# Patient Record
Sex: MALE | Race: WHITE | NOT HISPANIC OR LATINO | Employment: UNEMPLOYED | URBAN - METROPOLITAN AREA
[De-identification: names, ages, dates, MRNs, and addresses within clinical notes are randomized per-mention and may not be internally consistent; named-entity substitution may affect disease eponyms.]

---

## 2020-01-30 ENCOUNTER — HOSPITAL ENCOUNTER (EMERGENCY)
Facility: HOSPITAL | Age: 40
Discharge: HOME | End: 2020-01-30
Attending: EMERGENCY MEDICINE
Payer: MEDICAID

## 2020-01-30 VITALS
TEMPERATURE: 98 F | OXYGEN SATURATION: 96 % | BODY MASS INDEX: 25.05 KG/M2 | HEIGHT: 70 IN | DIASTOLIC BLOOD PRESSURE: 73 MMHG | WEIGHT: 175 LBS | SYSTOLIC BLOOD PRESSURE: 127 MMHG | RESPIRATION RATE: 23 BRPM | HEART RATE: 66 BPM

## 2020-01-30 DIAGNOSIS — R07.89 CHEST WALL PAIN: Primary | ICD-10-CM

## 2020-01-30 DIAGNOSIS — R07.9 CHEST PAIN: ICD-10-CM

## 2020-01-30 LAB
ALBUMIN SERPL BCP-MCNC: 4.9 G/DL (ref 3.5–5.2)
ALLENS TEST: ABNORMAL
ALP SERPL-CCNC: 78 U/L (ref 55–135)
ALT SERPL W/O P-5'-P-CCNC: 19 U/L (ref 10–44)
ANION GAP SERPL CALC-SCNC: 11 MMOL/L (ref 8–16)
AST SERPL-CCNC: 23 U/L (ref 10–40)
BASOPHILS # BLD AUTO: 0.05 K/UL (ref 0–0.2)
BASOPHILS NFR BLD: 0.5 % (ref 0–1.9)
BILIRUB SERPL-MCNC: 0.6 MG/DL (ref 0.1–1)
BNP SERPL-MCNC: 53 PG/ML (ref 0–99)
BUN SERPL-MCNC: 12 MG/DL (ref 6–20)
CALCIUM SERPL-MCNC: 9.7 MG/DL (ref 8.7–10.5)
CHLORIDE SERPL-SCNC: 101 MMOL/L (ref 95–110)
CO2 SERPL-SCNC: 30 MMOL/L (ref 23–29)
CREAT SERPL-MCNC: 1.1 MG/DL (ref 0.5–1.4)
DELSYS: ABNORMAL
DIFFERENTIAL METHOD: ABNORMAL
EOSINOPHIL # BLD AUTO: 0.1 K/UL (ref 0–0.5)
EOSINOPHIL NFR BLD: 0.7 % (ref 0–8)
ERYTHROCYTE [DISTWIDTH] IN BLOOD BY AUTOMATED COUNT: 13.5 % (ref 11.5–14.5)
EST. GFR  (AFRICAN AMERICAN): >60 ML/MIN/1.73 M^2
EST. GFR  (NON AFRICAN AMERICAN): >60 ML/MIN/1.73 M^2
GLUCOSE SERPL-MCNC: 84 MG/DL (ref 70–110)
GLUCOSE SERPL-MCNC: 84 MG/DL (ref 70–110)
HCO3 UR-SCNC: 35.4 MMOL/L (ref 24–28)
HCT VFR BLD AUTO: 45.9 % (ref 40–54)
HCT VFR BLD CALC: 45 %PCV (ref 36–54)
HGB BLD-MCNC: 15.5 G/DL (ref 14–18)
IMM GRANULOCYTES # BLD AUTO: 0.03 K/UL (ref 0–0.04)
IMM GRANULOCYTES NFR BLD AUTO: 0.3 % (ref 0–0.5)
LYMPHOCYTES # BLD AUTO: 1.7 K/UL (ref 1–4.8)
LYMPHOCYTES NFR BLD: 16 % (ref 18–48)
MCH RBC QN AUTO: 31.4 PG (ref 27–31)
MCHC RBC AUTO-ENTMCNC: 33.8 G/DL (ref 32–36)
MCV RBC AUTO: 93 FL (ref 82–98)
MONOCYTES # BLD AUTO: 0.9 K/UL (ref 0.3–1)
MONOCYTES NFR BLD: 8.1 % (ref 4–15)
NEUTROPHILS # BLD AUTO: 8 K/UL (ref 1.8–7.7)
NEUTROPHILS NFR BLD: 74.4 % (ref 38–73)
NRBC BLD-RTO: 0 /100 WBC
PCO2 BLDA: 55.2 MMHG (ref 35–45)
PH SMN: 7.42 [PH] (ref 7.35–7.45)
PLATELET # BLD AUTO: 207 K/UL (ref 150–350)
PMV BLD AUTO: 9.9 FL (ref 9.2–12.9)
PO2 BLDA: 23 MMHG (ref 40–60)
POC BE: 11 MMOL/L
POC IONIZED CALCIUM: 1.21 MMOL/L (ref 1.06–1.42)
POC SATURATED O2: 39 % (ref 95–100)
POC TCO2: 37 MMOL/L (ref 24–29)
POTASSIUM BLD-SCNC: 4.1 MMOL/L (ref 3.5–5.1)
POTASSIUM SERPL-SCNC: 3.8 MMOL/L (ref 3.5–5.1)
PROT SERPL-MCNC: 7.8 G/DL (ref 6–8.4)
RBC # BLD AUTO: 4.94 M/UL (ref 4.6–6.2)
SAMPLE: ABNORMAL
SITE: ABNORMAL
SODIUM BLD-SCNC: 142 MMOL/L (ref 136–145)
SODIUM SERPL-SCNC: 142 MMOL/L (ref 136–145)
TROPONIN I SERPL DL<=0.01 NG/ML-MCNC: <0.03 NG/ML
TROPONIN I SERPL DL<=0.01 NG/ML-MCNC: <0.03 NG/ML
WBC # BLD AUTO: 10.8 K/UL (ref 3.9–12.7)

## 2020-01-30 PROCEDURE — 99900035 HC TECH TIME PER 15 MIN (STAT)

## 2020-01-30 PROCEDURE — 84132 ASSAY OF SERUM POTASSIUM: CPT

## 2020-01-30 PROCEDURE — 83605 ASSAY OF LACTIC ACID: CPT

## 2020-01-30 PROCEDURE — 99285 EMERGENCY DEPT VISIT HI MDM: CPT | Mod: 25

## 2020-01-30 PROCEDURE — 85025 COMPLETE CBC W/AUTO DIFF WBC: CPT

## 2020-01-30 PROCEDURE — 80053 COMPREHEN METABOLIC PANEL: CPT

## 2020-01-30 PROCEDURE — 84295 ASSAY OF SERUM SODIUM: CPT | Mod: 59

## 2020-01-30 PROCEDURE — 84484 ASSAY OF TROPONIN QUANT: CPT

## 2020-01-30 PROCEDURE — 82803 BLOOD GASES ANY COMBINATION: CPT

## 2020-01-30 PROCEDURE — 84484 ASSAY OF TROPONIN QUANT: CPT | Mod: 91

## 2020-01-30 PROCEDURE — 83880 ASSAY OF NATRIURETIC PEPTIDE: CPT

## 2020-01-30 PROCEDURE — 93005 ELECTROCARDIOGRAM TRACING: CPT

## 2020-01-30 PROCEDURE — 82330 ASSAY OF CALCIUM: CPT

## 2020-01-30 PROCEDURE — 25000003 PHARM REV CODE 250: Performed by: EMERGENCY MEDICINE

## 2020-01-30 PROCEDURE — 36415 COLL VENOUS BLD VENIPUNCTURE: CPT

## 2020-01-30 RX ORDER — IBUPROFEN 600 MG/1
600 TABLET ORAL EVERY 6 HOURS PRN
Qty: 20 TABLET | Refills: 0 | Status: SHIPPED | OUTPATIENT
Start: 2020-01-30

## 2020-01-30 RX ORDER — METHOCARBAMOL 500 MG/1
1000 TABLET, FILM COATED ORAL 3 TIMES DAILY
Qty: 30 TABLET | Refills: 0 | Status: SHIPPED | OUTPATIENT
Start: 2020-01-30 | End: 2020-02-04

## 2020-01-30 RX ORDER — METHOCARBAMOL 500 MG/1
1500 TABLET, FILM COATED ORAL
Status: COMPLETED | OUTPATIENT
Start: 2020-01-30 | End: 2020-01-30

## 2020-01-30 RX ORDER — FAMOTIDINE 10 MG/ML
20 INJECTION INTRAVENOUS
Status: DISCONTINUED | OUTPATIENT
Start: 2020-01-30 | End: 2020-01-30

## 2020-01-30 RX ADMIN — IBUPROFEN 600 MG: 200 TABLET, FILM COATED ORAL at 02:01

## 2020-01-30 RX ADMIN — METHOCARBAMOL 1500 MG: 500 TABLET, FILM COATED ORAL at 02:01

## 2020-01-30 NOTE — ED PROVIDER NOTES
Encounter Date: 1/30/2020       History     Chief Complaint   Patient presents with    Chest Pain     39-year-old male with no significant past medical history presents with chest pain x1 day.  Patient states he was out in the backyard cutting a tree branch when he picked up a tree and throughout developed acute onset of chest pain to the left chest wall.  Patient states the pain was intermittent and would come and go.  He was able to go to a restaurant eat and states that in different positions he can reproduce the chest pain decided to come to emergency room for further evaluation.  Patient was brought in by his girlfriend who agrees that he has complained of his chest pain significantly would like to give further assessment.  Patient denies any fevers, chills, sweats, nausea, vomiting, diarrhea, shortness of breath. He is otherwise stable and has no other complaints.        Review of patient's allergies indicates:  No Known Allergies  No past medical history on file.  No past surgical history on file.  No family history on file.  Social History     Tobacco Use    Smoking status: Not on file   Substance Use Topics    Alcohol use: Not on file    Drug use: Not on file     Review of Systems   Constitutional: Negative for fever.   HENT: Negative for sore throat.    Respiratory: Negative for shortness of breath.    Cardiovascular: Positive for chest pain.   Gastrointestinal: Negative for nausea.   Genitourinary: Negative for dysuria.   Musculoskeletal: Negative for back pain.   Skin: Negative for rash.   Neurological: Negative for weakness.   Hematological: Does not bruise/bleed easily.   All other systems reviewed and are negative.      Physical Exam     Initial Vitals [01/30/20 1340]   BP Pulse Resp Temp SpO2   (!) 144/78 80 16 98.3 °F (36.8 °C) 99 %      MAP       --         Physical Exam    Nursing note and vitals reviewed.  Constitutional: He appears well-developed and well-nourished. He is not diaphoretic. No  distress.   HENT:   Head: Normocephalic and atraumatic.   Mouth/Throat: Oropharynx is clear and moist.   Eyes: Conjunctivae and EOM are normal. Pupils are equal, round, and reactive to light.   Neck: Normal range of motion. Neck supple. No thyromegaly present. No JVD present.   Cardiovascular: Normal rate, regular rhythm and normal heart sounds. Exam reveals no gallop and no friction rub.    No murmur heard.  Left chest wall tenderness on palpation   Pulmonary/Chest: Breath sounds normal. No respiratory distress. He has no wheezes. He exhibits no tenderness.   Abdominal: Soft. Bowel sounds are normal. He exhibits no distension. There is no tenderness. There is no rebound and no guarding.   Musculoskeletal: Normal range of motion. He exhibits no edema or tenderness.   Neurological: He is alert and oriented to person, place, and time. He has normal strength. No cranial nerve deficit or sensory deficit.   Skin: Skin is warm and dry. Capillary refill takes less than 2 seconds. No rash noted. No erythema.   Psychiatric: He has a normal mood and affect.         ED Course   Procedures  Labs Reviewed   CBC W/ AUTO DIFFERENTIAL - Abnormal; Notable for the following components:       Result Value    Mean Corpuscular Hemoglobin 31.4 (*)     Gran # (ANC) 8.0 (*)     Gran% 74.4 (*)     Lymph% 16.0 (*)     All other components within normal limits   COMPREHENSIVE METABOLIC PANEL - Abnormal; Notable for the following components:    CO2 30 (*)     All other components within normal limits   ISTAT PROCEDURE - Abnormal; Notable for the following components:    POC PCO2 55.2 (*)     POC PO2 23 (*)     POC HCO3 35.4 (*)     POC SATURATED O2 39 (*)     POC TCO2 37 (*)     All other components within normal limits   TROPONIN I   B-TYPE NATRIURETIC PEPTIDE   TROPONIN I        ECG Results          EKG 12-lead (In process)  Result time 01/30/20 14:45:51    In process by Interface, Lab In [facility] (01/30/20 14:45:51)                  Narrative:    Test Reason : R07.9,    Vent. Rate : 072 BPM     Atrial Rate : 072 BPM     P-R Int : 234 ms          QRS Dur : 082 ms      QT Int : 350 ms       P-R-T Axes : 055 075 055 degrees     QTc Int : 383 ms    Sinus rhythm with 1st degree A-V block  Early repolarization  Otherwise normal ECG  No previous ECGs available    Referred By: [email or web address]   SELF           Confirmed By:                             Imaging Results          X-Ray Chest PA And Lateral (Final result)  Result time 01/30/20 14:18:49    Final result by Blas Veliz MD (01/30/20 14:18:49)                 Impression:      No evidence of active cardiopulmonary disease.      Electronically signed by: Blas Veliz MD  Date:    01/30/2020  Time:    14:18             Narrative:    EXAMINATION:  XR CHEST PA AND LATERAL    CLINICAL HISTORY:  R 0 7.9 chest pain, unspecified    FINDINGS:  PA and lateral chest radiograph at 14:07 hours with no prior studies for comparison show the trachea is midline, with the cardiomediastinal silhouette and pulmonary vascular distribution within normal limits.    The lungs are normally and symmetrically expanded, with no consolidation, pleural effusion or evidence of pulmonary edema. No confluent infiltrates or pneumothorax. There are no significant osseous abnormalities.                                 Medical Decision Making:   Initial Assessment:   39-year-old male on initial assessment in mild distress secondary to concern of chest pain and discomfort.  Patient is alert oriented x3, neurologically neurovascular intact no focal deficits.  He is nontoxic appearing at this time vitals are stable.  Differential Diagnosis:   MSK chest wall pain, GERD, mi, pericarditis, rib fracture  Independently Interpreted Test(s):   I have ordered and independently interpreted X-rays - see prior notes.  I have ordered and independently interpreted EKG Reading(s) - see prior notes  Clinical Tests:   Lab Tests: Ordered and Reviewed  The  following lab test(s) were unremarkable: CBC, CMP, Troponin and BNP  Radiological Study: Ordered and Reviewed  Medical Tests: Ordered and Reviewed  ED Management:  Patient has been reassessed noted to have no acute changes condition. Patient has very low risk factors for any cardiac disease and was given Robaxin ibuprofen for reproducible chest pain. Patient's labs are unremarkable for any acute pathology at this time and patient has remained stable while in the ED.  Patient be discharged home stable condition with follow-up as needed and will continue to take ibuprofen and Robaxin.   is aware of the plan and in agreement.                                 Clinical Impression:       ICD-10-CM ICD-9-CM   1. Chest wall pain R07.89 786.52   2. Chest pain R07.9 786.50                             Alexandr Radford MD  01/30/20 1736

## 2025-06-24 ENCOUNTER — HOSPITAL ENCOUNTER (EMERGENCY)
Facility: HOSPITAL | Age: 45
Discharge: HOME | End: 2025-06-24
Attending: EMERGENCY MEDICINE

## 2025-06-24 ENCOUNTER — TELEPHONE (OUTPATIENT)
Dept: ORTHOPEDICS | Facility: CLINIC | Age: 45
End: 2025-06-24

## 2025-06-24 VITALS
DIASTOLIC BLOOD PRESSURE: 88 MMHG | BODY MASS INDEX: 25.77 KG/M2 | RESPIRATION RATE: 18 BRPM | TEMPERATURE: 98 F | OXYGEN SATURATION: 96 % | SYSTOLIC BLOOD PRESSURE: 142 MMHG | WEIGHT: 180 LBS | HEART RATE: 57 BPM | HEIGHT: 70 IN

## 2025-06-24 DIAGNOSIS — S99.911A RIGHT ANKLE INJURY, INITIAL ENCOUNTER: ICD-10-CM

## 2025-06-24 DIAGNOSIS — S97.81XA: Primary | ICD-10-CM

## 2025-06-24 LAB
HCV AB SERPL QL IA: NORMAL
HIV 1+2 AB+HIV1 P24 AG SERPL QL IA: NORMAL
HOLD SPECIMEN: NORMAL
HOLD SPECIMEN: NORMAL

## 2025-06-24 PROCEDURE — 96375 TX/PRO/DX INJ NEW DRUG ADDON: CPT

## 2025-06-24 PROCEDURE — 96376 TX/PRO/DX INJ SAME DRUG ADON: CPT

## 2025-06-24 PROCEDURE — 63600175 PHARM REV CODE 636 W HCPCS: Mod: JZ | Performed by: EMERGENCY MEDICINE

## 2025-06-24 PROCEDURE — 25000003 PHARM REV CODE 250: Performed by: EMERGENCY MEDICINE

## 2025-06-24 PROCEDURE — 36415 COLL VENOUS BLD VENIPUNCTURE: CPT | Performed by: EMERGENCY MEDICINE

## 2025-06-24 PROCEDURE — 99284 EMERGENCY DEPT VISIT MOD MDM: CPT | Mod: 25

## 2025-06-24 PROCEDURE — 86803 HEPATITIS C AB TEST: CPT | Performed by: EMERGENCY MEDICINE

## 2025-06-24 PROCEDURE — 96374 THER/PROPH/DIAG INJ IV PUSH: CPT

## 2025-06-24 PROCEDURE — 87389 HIV-1 AG W/HIV-1&-2 AB AG IA: CPT | Performed by: EMERGENCY MEDICINE

## 2025-06-24 RX ORDER — CEPHALEXIN 500 MG/1
500 CAPSULE ORAL 4 TIMES DAILY
Qty: 20 CAPSULE | Refills: 0 | Status: SHIPPED | OUTPATIENT
Start: 2025-06-24 | End: 2025-06-29

## 2025-06-24 RX ORDER — OXYCODONE AND ACETAMINOPHEN 10; 325 MG/1; MG/1
1 TABLET ORAL
Refills: 0 | Status: COMPLETED | OUTPATIENT
Start: 2025-06-24 | End: 2025-06-24

## 2025-06-24 RX ORDER — HYDROMORPHONE HYDROCHLORIDE 1 MG/ML
1 INJECTION, SOLUTION INTRAMUSCULAR; INTRAVENOUS; SUBCUTANEOUS
Refills: 0 | Status: COMPLETED | OUTPATIENT
Start: 2025-06-24 | End: 2025-06-24

## 2025-06-24 RX ORDER — CEFAZOLIN SODIUM 1 G/3ML
1 INJECTION, POWDER, FOR SOLUTION INTRAMUSCULAR; INTRAVENOUS
Status: DISCONTINUED | OUTPATIENT
Start: 2025-06-24 | End: 2025-06-24

## 2025-06-24 RX ORDER — CEFAZOLIN SODIUM 1 G/3ML
1 INJECTION, POWDER, FOR SOLUTION INTRAMUSCULAR; INTRAVENOUS
Status: COMPLETED | OUTPATIENT
Start: 2025-06-24 | End: 2025-06-24

## 2025-06-24 RX ORDER — HYDROMORPHONE HYDROCHLORIDE 2 MG/ML
2 INJECTION, SOLUTION INTRAMUSCULAR; INTRAVENOUS; SUBCUTANEOUS
Refills: 0 | Status: COMPLETED | OUTPATIENT
Start: 2025-06-24 | End: 2025-06-24

## 2025-06-24 RX ORDER — OXYCODONE AND ACETAMINOPHEN 10; 325 MG/1; MG/1
1 TABLET ORAL EVERY 6 HOURS PRN
Qty: 12 TABLET | Refills: 0 | Status: SHIPPED | OUTPATIENT
Start: 2025-06-24

## 2025-06-24 RX ADMIN — HYDROMORPHONE HYDROCHLORIDE 1 MG: 1 INJECTION, SOLUTION INTRAMUSCULAR; INTRAVENOUS; SUBCUTANEOUS at 01:06

## 2025-06-24 RX ADMIN — CEFAZOLIN 1 G: 330 INJECTION, POWDER, FOR SOLUTION INTRAMUSCULAR; INTRAVENOUS at 01:06

## 2025-06-24 RX ADMIN — BACITRACIN ZINC, NEOMYCIN, POLYMYXIN B 1 EACH: 400; 3.5; 5 OINTMENT TOPICAL at 02:06

## 2025-06-24 RX ADMIN — OXYCODONE HYDROCHLORIDE AND ACETAMINOPHEN 1 TABLET: 10; 325 TABLET ORAL at 01:06

## 2025-06-24 RX ADMIN — HYDROMORPHONE HYDROCHLORIDE 2 MG: 2 INJECTION, SOLUTION INTRAMUSCULAR; INTRAVENOUS; SUBCUTANEOUS at 12:06

## 2025-06-24 NOTE — ED NOTES
Patient placed in room,15, bleeding controlled, obvious swelling to the right ankle with open wound, cap refill >3 sec with pedal pulses present

## 2025-06-24 NOTE — ED PROVIDER NOTES
Encounter Date: 6/24/2025       History     Chief Complaint   Patient presents with    Foot Injury     Right foot crushed by skid steer     HPI 45-year-old man who presents emergency department with the acute onset traumatic right foot pain when bucket of a skid steer bulldozer came down on his foot just prior to arrival.  Review of patient's allergies indicates:  No Known Allergies  No past medical history on file.  No past surgical history on file.  No family history on file.  Social History[1]  Review of Systems   Constitutional:  Negative for fever.   HENT:  Negative for sore throat.    Respiratory:  Negative for shortness of breath.    Cardiovascular:  Negative for chest pain.   Gastrointestinal:  Negative for nausea.   Genitourinary:  Negative for dysuria.   Musculoskeletal:  Positive for arthralgias and gait problem. Negative for back pain.   Skin:  Negative for rash.   Neurological:  Negative for weakness.   Hematological:  Does not bruise/bleed easily.       Physical Exam     Initial Vitals [06/24/25 1206]   BP Pulse Resp Temp SpO2   (!) 142/88 99 18 97.6 °F (36.4 °C) 97 %      MAP       --         Physical Exam    Nursing note and vitals reviewed.  Constitutional: He appears well-developed and well-nourished.   HENT:   Head: Normocephalic and atraumatic.   Eyes: EOM are normal. Pupils are equal, round, and reactive to light.   Neck: Neck supple.   Cardiovascular:  Normal rate and intact distal pulses.           Pulmonary/Chest: Breath sounds normal. No respiratory distress.   Musculoskeletal:         General: Tenderness and edema present. Normal range of motion.      Cervical back: Neck supple.      Comments: Small open wound that is not bleeding over the dorsum of the midfoot.  Surrounding edema and bruising.  PT and DP pulses are intact.  Patient able to wiggle toes and plantar flex and dorsiflexes ankle fully.  Has normal sensation.     Neurological: He is alert and oriented to person, place, and time.  He has normal strength. No sensory deficit. GCS score is 15. GCS eye subscore is 4. GCS verbal subscore is 5. GCS motor subscore is 6.   Skin: Skin is warm and dry.               ED Course   Procedures  Labs Reviewed   HEPATITIS C ANTIBODY - Normal       Result Value    Hep C Ab Interp Non-Reactive     HIV 1 / 2 ANTIBODY - Normal    HIV 1/2 Ag/Ab Non-Reactive     HEP C VIRUS HOLD SPECIMEN    Extra Tube Hold for add-ons.     HIV VIRUS CONFIRMATION HOLD SPECIMEN    Extra Tube Hold for add-ons.            Imaging Results              X-Ray Ankle Complete Right (Final result)  Result time 06/24/25 13:20:57      Final result by Rene Holder Jr., MD (06/24/25 13:20:57)                   Impression:      Soft tissue swelling and air bubbles in the soft tissues anterior to the right ankle joint.  A fracture however is not identified.  The ankle mortise is intact      Electronically signed by: Rene Holder MD  Date:    06/24/2025  Time:    13:20               Narrative:    EXAMINATION:  XR ANKLE COMPLETE 3 VIEW RIGHT    CLINICAL HISTORY:  Unspecified injury of right ankle, initial encounter    TECHNIQUE:  AP, lateral and oblique views of the ankle were performed.    COMPARISON:  Right foot x-ray of June 24, 2025    FINDINGS:  There is no evidence of acute fracture or dislocation.  There are air bubbles in the swollen soft tissues anterior to the ankle.  Foreign bodies are not seen.  The ankle mortise is intact.                                       X-Ray Foot Complete Right (Final result)  Result time 06/24/25 13:19:47      Final result by Rene Holder Jr., MD (06/24/25 13:19:47)                   Impression:      Air in the soft tissues anterior to the ankle joint likely due to trauma.  Mild degenerative changes at the talonavicular joint.  Bunion formation with hallux valgus.  Old healed fracture of the 4th metatarsal.  An acute fracture is not identified.      Electronically signed by: Rene Holder  MD  Date:    06/24/2025  Time:    13:19               Narrative:    EXAMINATION:  XR FOOT COMPLETE 3 VIEW RIGHT    CLINICAL HISTORY:  . Crushing injury of right foot, initial encounter    TECHNIQUE:  AP, lateral, and oblique views of the right foot were performed.    COMPARISON:  None    FINDINGS:  There is an old healed oblique fracture of the 4th metatarsal without angulation.  Bunion formation with mild hallux valgus is noted.  Degenerative changes are noted at the talonavicular joint.  An acute fracture of the bones of the foot is not seen.  There appears to be air in the soft tissues anterior to the ankle joint.  This is likely due to trauma but a fracture of the visualized tibia fibula or talus is not seen.                                       Medications   HYDROmorphone (PF) injection 2 mg (2 mg Intravenous Given 6/24/25 1215)   ceFAZolin injection 1 g (1 g Intravenous Given 6/24/25 1353)   HYDROmorphone injection 1 mg (1 mg Intravenous Given 6/24/25 1348)   oxyCODONE-acetaminophen  mg per tablet 1 tablet (1 tablet Oral Given 6/24/25 1348)   neomycin-bacitracnZn-polymyxnB packet (1 each Topical (Top) Given 6/24/25 1426)     Medical Decision Making  45-year-old man who presents emergency department for evaluation of crush injury to his right foot.  Patient is neurovascularly intact.  Tetanus is up-to-date per patient.  Differential diagnosis includes fracture, dislocation, soft tissue injury, foreign bodies.  X-ray obtained of the foot showing no evidence of any underlying fracture per my interpretation.  He does have air and soft tissue injury likely due from the crush injury.  No evidence of foreign bodies.  Patient given Ancef in the emergency department.  Pain control achieved with IV Dilaudid.  Wound care performed and antibiotic ointment applied.  Patient will be discharged home with crutches, Keflex, Percocet and ambulatory referral to orthopedics for re-evaluation.  Return precautions discussed.   Discharged in no acute distress.    Amount and/or Complexity of Data Reviewed  Radiology: ordered.    Risk  OTC drugs.  Prescription drug management.                                      Clinical Impression:  Final diagnoses:  [S97.81XA] Crush injury of right foot (Primary)  [S99.911A] Right ankle injury, initial encounter          ED Disposition Condition    Discharge Stable          ED Prescriptions       Medication Sig Dispense Start Date End Date Auth. Provider    cephALEXin (KEFLEX) 500 MG capsule Take 1 capsule (500 mg total) by mouth 4 (four) times daily. for 5 days 20 capsule 6/24/2025 6/29/2025 Zain Sorto MD    oxyCODONE-acetaminophen (PERCOCET)  mg per tablet Take 1 tablet by mouth every 6 (six) hours as needed for Pain. 12 tablet 6/24/2025 -- Zain Sorto MD          Follow-up Information       Follow up With Specialties Details Why Contact Info Additional Information    [facility] -  Emergency Medicine  As needed, If symptoms worsen [address] [place] [address] [address] [place] [address] 1st floor                   [1]         Zain Sorto MD  06/25/25 5440

## 2025-06-24 NOTE — TELEPHONE ENCOUNTER
----- Message from Med Assistant Gildardo sent at 6/24/2025  3:15 PM CDT -----  Regarding: ER f/u, Self Pay  Contact: Gildardo/Tracy  Please note referral in system from [facility] dated today 6/24/25 for: Crush injury of right foot [S97.81XA]    Thanks,  Gildardo

## 2025-06-24 NOTE — TELEPHONE ENCOUNTER
Appt scheduled and confirmed. Scheduled next week per provider request to allow swelling to go down for evaluation.

## 2025-07-01 ENCOUNTER — OFFICE VISIT (OUTPATIENT)
Dept: ORTHOPEDICS | Facility: CLINIC | Age: 45
End: 2025-07-01

## 2025-07-01 VITALS — HEIGHT: 70 IN | BODY MASS INDEX: 25.75 KG/M2 | WEIGHT: 179.88 LBS

## 2025-07-01 DIAGNOSIS — S97.81XA: ICD-10-CM

## 2025-07-01 PROCEDURE — 99204 OFFICE O/P NEW MOD 45 MIN: CPT | Mod: S$PBB,,, | Performed by: ORTHOPAEDIC SURGERY

## 2025-07-01 PROCEDURE — 99999 PR PBB SHADOW E&M-EST. PATIENT-LVL III: CPT | Mod: PBBFAC,,, | Performed by: ORTHOPAEDIC SURGERY

## 2025-07-01 PROCEDURE — 99213 OFFICE O/P EST LOW 20 MIN: CPT | Mod: PBBFAC,PO | Performed by: ORTHOPAEDIC SURGERY

## 2025-07-01 RX ORDER — SULFAMETHOXAZOLE AND TRIMETHOPRIM 800; 160 MG/1; MG/1
1 TABLET ORAL 2 TIMES DAILY
Qty: 20 TABLET | Refills: 0 | Status: SHIPPED | OUTPATIENT
Start: 2025-07-01

## 2025-07-01 RX ORDER — HYDROCODONE BITARTRATE AND ACETAMINOPHEN 5; 325 MG/1; MG/1
1 TABLET ORAL EVERY 6 HOURS PRN
Qty: 28 TABLET | Refills: 0 | Status: SHIPPED | OUTPATIENT
Start: 2025-07-01

## 2025-07-01 NOTE — PROGRESS NOTES
Patient ID: Go Rivas is a 45 y.o. male    Chief Complaint:   Chief Complaint   Patient presents with    Right Ankle - Injury     06/24/2025 ankle/foot  injury       History of Present Illness:    Pleasant 45-year-old male here for evaluation of a crush injury to the right foot.  This happened about a week ago when he was working on a skid steer and the bucket landed on his foot and forcibly plantar flex his foot.  He came to the emergency department and had x-rays done which were normal.  He had an abrasion to the dorsum of the foot and was placed on antibiotics.  He has difficulty bearing weight on the heel.  Most of his pain is laterally in the ankle region.      PAST MEDICAL HISTORY: No past medical history on file.  PAST SURGICAL HISTORY: No past surgical history on file.  FAMILY HISTORY: No family history on file.  SOCIAL HISTORY:   Social History     Occupational History    Not on file   Tobacco Use    Smoking status: Not on file    Smokeless tobacco: Not on file   Substance and Sexual Activity    Alcohol use: Not on file    Drug use: Not on file    Sexual activity: Not on file        MEDICATIONS: Current Medications[1]  ALLERGIES: Review of patient's allergies indicates:  No Known Allergies      Physical Exam     There were no vitals filed for this visit.  Alert and oriented to person, place and time. No acute distress. Well-groomed, not ill appearing. Pupils round and reactive, normal respiratory effort, no audible wheezing.     On exam he has an abrasion to the dorsum of the foot which appears well healed without infection.  There is cellulitic change of the posterolateral ankle and hindfoot with blanching of the skin.  He has tenderness over the lateral malleolus and calcaneal tuberosity.  He has tenderness over the ATFL.  No significant pain with forefoot compression or at the Lisfranc articulation.  There was moderate foot swelling with soft compartments.  Achilles intact      Imaging:       X-Ray:  I have reviewed all pertinent results/findings and my personal findings are:  No evidence of fracture.  Dorsal soft tissue swelling of the ankle and midfoot      Assessment & Plan    Crush injury of right foot  -     Ambulatory referral/consult to Orthopedics  -     MRI Ankle Without Contrast Right; Future; Expected date: 07/01/2025         Treatment options were discussed with him in detail.  His x-rays are overall unremarkable.  This is likely a crush injury and soft tissue injury.  We discussed soft tissue rest using short boot, ice and elevation.  I will place him on some oral antibiotics and pain medication for likely post trauma cellulitis.  Also recommend an MRI of the ankle due to his inability to bear weight.  This will be done in 1-2 weeks to allow for the soft tissue swelling to resolve                       [1]   Current Outpatient Medications:     ibuprofen (ADVIL,MOTRIN) 600 MG tablet, Take 1 tablet (600 mg total) by mouth every 6 (six) hours as needed for Pain., Disp: 20 tablet, Rfl: 0    oxyCODONE-acetaminophen (PERCOCET)  mg per tablet, Take 1 tablet by mouth every 6 (six) hours as needed for Pain., Disp: 12 tablet, Rfl: 0